# Patient Record
Sex: MALE | Race: WHITE | NOT HISPANIC OR LATINO
[De-identification: names, ages, dates, MRNs, and addresses within clinical notes are randomized per-mention and may not be internally consistent; named-entity substitution may affect disease eponyms.]

---

## 2023-06-15 ENCOUNTER — APPOINTMENT (OUTPATIENT)
Dept: COLORECTAL SURGERY | Facility: CLINIC | Age: 38
End: 2023-06-15
Payer: COMMERCIAL

## 2023-06-15 VITALS
DIASTOLIC BLOOD PRESSURE: 84 MMHG | OXYGEN SATURATION: 98 % | HEIGHT: 69 IN | TEMPERATURE: 98.8 F | BODY MASS INDEX: 26.66 KG/M2 | WEIGHT: 180 LBS | HEART RATE: 67 BPM | SYSTOLIC BLOOD PRESSURE: 118 MMHG | RESPIRATION RATE: 18 BRPM

## 2023-06-15 DIAGNOSIS — K64.3 FOURTH DEGREE HEMORRHOIDS: ICD-10-CM

## 2023-06-15 DIAGNOSIS — Z80.8 FAMILY HISTORY OF MALIGNANT NEOPLASM OF OTHER ORGANS OR SYSTEMS: ICD-10-CM

## 2023-06-15 DIAGNOSIS — Z83.3 FAMILY HISTORY OF DIABETES MELLITUS: ICD-10-CM

## 2023-06-15 DIAGNOSIS — Z78.9 OTHER SPECIFIED HEALTH STATUS: ICD-10-CM

## 2023-06-15 PROBLEM — Z00.00 ENCOUNTER FOR PREVENTIVE HEALTH EXAMINATION: Status: ACTIVE | Noted: 2023-06-15

## 2023-06-15 PROCEDURE — 99204 OFFICE O/P NEW MOD 45 MIN: CPT

## 2023-06-16 PROBLEM — K64.3 PROLAPSED INTERNAL HEMORRHOIDS, GRADE 4: Status: ACTIVE | Noted: 2023-06-16

## 2023-06-16 NOTE — HISTORY OF PRESENT ILLNESS
[FreeTextEntry1] : Eugene Navarro is a 38-year-old gentleman referred by Dr. James for evaluation and management of chronic hemorrhoidal issues.  Eugene reports over the past 3 years at least he has experienced regular problems of anorectal discomfort, pain, pressure.  Over the past couple of months the anorectal hemorrhoidal issues have become more intense and regular.  He reports a significant post defecation anal pain lasting for a good hour or 2 following the bowel movement.\par \par He reports a normal colonoscopy in 2022 with Dr. James.  He is otherwise a healthy man.

## 2023-06-16 NOTE — ASSESSMENT
[FreeTextEntry1] : 38-year-old male with chronic and acute anorectal issues.  He certainly has significant left lateral hemorrhoidal complex which would be best managed with hemorrhoidectomy.  Additionally I suspect he has an anal fissure which again can also be managed at the time of surgical care.\par \par I discussed with Eugene the decision to either limits hemorrhoidectomy to the immediately abnormal left lateral quadrant, or to anticipate a full left and right 3 quadrant hemorrhoidectomy.  He and I both agree that if at time of surgery the right-sided hemorrhoidal tissues are not in need of surgical management that they will be left in place.\par \par We are moving forward with surgical scheduling.

## 2023-06-16 NOTE — CONSULT LETTER
[Dear  ___] : Dear  [unfilled], [( Thank you for referring [unfilled] for consultation for _____ )] : Thank you for referring [unfilled] for consultation for [unfilled] [Please see my note below.] : Please see my note below. [Consult Closing:] : Thank you very much for allowing me to participate in the care of this patient.  If you have any questions, please do not hesitate to contact me. [Sincerely,] : Sincerely, [FreeTextEntry2] : Federico James MD [FreeTextEntry1] : Mr. Navarro has chronic internal/external left lateral hemorrhoidal engorgement with grade 4 prolapse.  We are moving forward with surgical management.  Additionally I suspect he may have an associated anterior midline anal fissure.  If so that will also be managed at the time of surgery [FreeTextEntry3] : Jeromy Morris MD [facility]\par

## 2023-06-16 NOTE — PHYSICAL EXAM
[FreeTextEntry1] : Anorectal examination reveals chronic grade 4 internal/external hemorrhoidal engorgement prolapse in the left lateral quadrants.\par Additionally I suspect there is an anal fissure in the anterior midline anal canal location.\par \par I discussed with the patient surgical recommendations and we have agreed to defer the remainder of the anorectal examination until the patient is under anesthesia.

## 2023-06-16 NOTE — REASON FOR VISIT
[Initial Evaluation] : an initial evaluation [FreeTextEntry1] : 38-year-old male presenting for evaluation and management chronic anorectal hemorrhoidal issues

## 2023-06-16 NOTE — ADDENDUM
[FreeTextEntry1] : 45 minutes spent in total with in person and associated time.  Premeeting review of the available referring information.  Post meeting organization of the surgical router and surgical scheduling process, with communication completed back to referring physician in communication with surgical scheduling team.

## 2023-06-27 ENCOUNTER — RESULT REVIEW (OUTPATIENT)
Age: 38
End: 2023-06-27

## 2023-06-28 PROCEDURE — 88304 TISSUE EXAM BY PATHOLOGIST: CPT | Mod: 26

## 2023-06-29 ENCOUNTER — APPOINTMENT (OUTPATIENT)
Dept: COLORECTAL SURGERY | Facility: HOSPITAL | Age: 38
End: 2023-06-29
Payer: COMMERCIAL

## 2023-06-29 ENCOUNTER — TRANSCRIPTION ENCOUNTER (OUTPATIENT)
Age: 38
End: 2023-06-29

## 2023-06-29 PROCEDURE — 46261 REMOVE IN/EX HEM GRPS & FISS: CPT

## 2023-07-21 ENCOUNTER — APPOINTMENT (OUTPATIENT)
Dept: COLORECTAL SURGERY | Facility: CLINIC | Age: 38
End: 2023-07-21
Payer: COMMERCIAL

## 2023-07-21 VITALS
HEART RATE: 68 BPM | DIASTOLIC BLOOD PRESSURE: 88 MMHG | HEIGHT: 69 IN | OXYGEN SATURATION: 100 % | WEIGHT: 180 LBS | BODY MASS INDEX: 26.66 KG/M2 | RESPIRATION RATE: 18 BRPM | SYSTOLIC BLOOD PRESSURE: 130 MMHG

## 2023-07-21 DIAGNOSIS — K60.0 ACUTE ANAL FISSURE: ICD-10-CM

## 2023-07-21 DIAGNOSIS — K64.4 RESIDUAL HEMORRHOIDAL SKIN TAGS: ICD-10-CM

## 2023-07-21 PROCEDURE — 99024 POSTOP FOLLOW-UP VISIT: CPT

## 2023-07-21 RX ORDER — OXYCODONE 5 MG/1
5 TABLET ORAL EVERY 8 HOURS
Qty: 15 | Refills: 0 | Status: DISCONTINUED | COMMUNITY
Start: 2023-06-29 | End: 2023-07-21

## 2023-07-21 NOTE — REASON FOR VISIT
[Post Op: _________] : a [unfilled] post op visit [FreeTextEntry1] : Ramon, 38-year-old gentleman, presents for postoperative evaluation following a limited hemorrhoidectomy and anal fissure repair, June 29, 2023

## 2023-07-21 NOTE — CONSULT LETTER
[Dear  ___] : Dear  [unfilled], [Sincerely,] : Sincerely, [FreeTextEntry1] : Eugene is doing well following surgical management of an anal fissure and symptomatic hemorrhoids.  At today's examination there is no evidence of infection or complication.  He is invited to resume all normal activities as he feels he is ready.\par As amanda Magaña many thanks for your kind referrals and the opportunity to participate in the care of your patients. [FreeTextEntry3] : Jeromy Morris MD [facility]\par

## 2023-07-21 NOTE — PHYSICAL EXAM
[FreeTextEntry1] : Anorectal examination shows all wounds to be healing without signs of infection.  The left lateral stitch is trimmed.\par \par Calmoseptine recommended to the patient for management of the normal postoperative pruritic itch that is typical with a healing wound.  Otherwise he is invited to contact me for any ongoing issues of concern.\par He is free to resume normal activities as he is comfortable and feels he is ready.

## 2023-07-21 NOTE — HISTORY OF PRESENT ILLNESS
[FreeTextEntry1] : He is doing well postoperatively.  No unusual complaints.  Having daily bowel movements.  The pain has been modest.  He has not required any narcotic pain medicine use.  Successfully managed with ibuprofen and Tylenol.

## 2023-11-17 ENCOUNTER — TRANSCRIPTION ENCOUNTER (OUTPATIENT)
Age: 38
End: 2023-11-17

## 2024-04-05 ENCOUNTER — APPOINTMENT (OUTPATIENT)
Dept: COLORECTAL SURGERY | Facility: CLINIC | Age: 39
End: 2024-04-05
Payer: COMMERCIAL

## 2024-04-05 VITALS
DIASTOLIC BLOOD PRESSURE: 81 MMHG | SYSTOLIC BLOOD PRESSURE: 130 MMHG | WEIGHT: 175 LBS | HEIGHT: 69 IN | HEART RATE: 64 BPM | BODY MASS INDEX: 25.92 KG/M2

## 2024-04-05 DIAGNOSIS — K62.5 HEMORRHAGE OF ANUS AND RECTUM: ICD-10-CM

## 2024-04-05 DIAGNOSIS — L29.0 PRURITUS ANI: ICD-10-CM

## 2024-04-05 PROCEDURE — 99213 OFFICE O/P EST LOW 20 MIN: CPT

## 2024-04-17 PROBLEM — K62.5 ANORECTAL HEMORRHAGE: Status: ACTIVE | Noted: 2023-06-16

## 2024-04-17 PROBLEM — L29.0 PRURITUS ANI: Status: ACTIVE | Noted: 2023-06-16

## 2024-04-17 NOTE — ASSESSMENT
[FreeTextEntry1] : I will follow-up with Eugene over the next weeks to ensure resolution of the bleeding and mucousy discharge, which I believe is due to this small soft granulation tissue which is assumed to be associated with the previous surgical management from June of last year.  Eugene knows to communicate with me or represent if these issues continue at which time additional diagnostic efforts will be undertaken.

## 2024-04-17 NOTE — PROCEDURE
[FreeTextEntry1] : Following a discussion, and patient verbal consent, silver nitrate is applied to the granulation tissue located at the anal verge, without complication

## 2024-04-17 NOTE — PHYSICAL EXAM
[FreeTextEntry1] : Anorectal examination reveals in the anterior midline, the site of the previous anal fissure, a very small less than 1 cm pedunculated granulation tissue.  The exam is otherwise without abnormal finding.

## 2024-04-17 NOTE — HISTORY OF PRESENT ILLNESS
[FreeTextEntry1] : Eugene Navarro is a 38-year-old gentleman who underwent surgical hemorrhoidectomy June 2023 under my care.  He returns today for evaluation and management recommendations concerning a very small occasional visible bright red blood per rectum with bowel movements.  He also reports that there is a small occasional mucousy discharge.  He reports that the anal pain due to his fissure has completely resolved postoperatively.  He underwent colonoscopic evaluation with Dr. James in February 2022, without polyps or other abnormalities besides noted hemorrhoids described.

## 2024-04-17 NOTE — CONSULT LETTER
[Dear  ___] : Dear  [unfilled], [Please see my note below.] : Please see my note below. [Sincerely,] : Sincerely, [FreeTextEntry2] : Gómez James MD [FreeTextEntry1] :   BRENDON   many thanks [FreeTextEntry3] : Jeromy Morris MD [facility]